# Patient Record
Sex: FEMALE | Race: WHITE | NOT HISPANIC OR LATINO
[De-identification: names, ages, dates, MRNs, and addresses within clinical notes are randomized per-mention and may not be internally consistent; named-entity substitution may affect disease eponyms.]

---

## 2018-10-23 ENCOUNTER — APPOINTMENT (OUTPATIENT)
Dept: GASTROENTEROLOGY | Facility: CLINIC | Age: 68
End: 2018-10-23
Payer: MEDICARE

## 2018-10-23 VITALS
TEMPERATURE: 99 F | HEIGHT: 67 IN | HEART RATE: 91 BPM | BODY MASS INDEX: 31.55 KG/M2 | DIASTOLIC BLOOD PRESSURE: 84 MMHG | SYSTOLIC BLOOD PRESSURE: 120 MMHG | WEIGHT: 201 LBS | OXYGEN SATURATION: 98 %

## 2018-10-23 DIAGNOSIS — Z83.3 FAMILY HISTORY OF DIABETES MELLITUS: ICD-10-CM

## 2018-10-23 DIAGNOSIS — Z82.5 FAMILY HISTORY OF ASTHMA AND OTHER CHRONIC LOWER RESPIRATORY DISEASES: ICD-10-CM

## 2018-10-23 DIAGNOSIS — Z82.49 FAMILY HISTORY OF ISCHEMIC HEART DISEASE AND OTHER DISEASES OF THE CIRCULATORY SYSTEM: ICD-10-CM

## 2018-10-23 DIAGNOSIS — G47.9 SLEEP DISORDER, UNSPECIFIED: ICD-10-CM

## 2018-10-23 DIAGNOSIS — K63.9 DISEASE OF INTESTINE, UNSPECIFIED: ICD-10-CM

## 2018-10-23 DIAGNOSIS — Z82.61 FAMILY HISTORY OF ARTHRITIS: ICD-10-CM

## 2018-10-23 DIAGNOSIS — Z87.39 PERSONAL HISTORY OF OTHER DISEASES OF THE MUSCULOSKELETAL SYSTEM AND CONNECTIVE TISSUE: ICD-10-CM

## 2018-10-23 DIAGNOSIS — Z81.1 FAMILY HISTORY OF ALCOHOL ABUSE AND DEPENDENCE: ICD-10-CM

## 2018-10-23 DIAGNOSIS — Z87.19 PERSONAL HISTORY OF OTHER DISEASES OF THE DIGESTIVE SYSTEM: ICD-10-CM

## 2018-10-23 DIAGNOSIS — Z86.39 PERSONAL HISTORY OF OTHER ENDOCRINE, NUTRITIONAL AND METABOLIC DISEASE: ICD-10-CM

## 2018-10-23 DIAGNOSIS — Z87.42 PERSONAL HISTORY OF OTHER DISEASES OF THE FEMALE GENITAL TRACT: ICD-10-CM

## 2018-10-23 DIAGNOSIS — Z87.898 PERSONAL HISTORY OF OTHER SPECIFIED CONDITIONS: ICD-10-CM

## 2018-10-23 DIAGNOSIS — Z12.11 ENCOUNTER FOR SCREENING FOR MALIGNANT NEOPLASM OF COLON: ICD-10-CM

## 2018-10-23 DIAGNOSIS — Z82.69 FAMILY HISTORY OF OTHER DISEASES OF THE MUSCULOSKELETAL SYSTEM AND CONNECTIVE TISSUE: ICD-10-CM

## 2018-10-23 DIAGNOSIS — Z83.49 FAMILY HISTORY OF OTHER ENDOCRINE, NUTRITIONAL AND METABOLIC DISEASES: ICD-10-CM

## 2018-10-23 PROCEDURE — 99204 OFFICE O/P NEW MOD 45 MIN: CPT

## 2018-10-23 RX ORDER — GRAPE SEED EXTRACT 50 MG
CAPSULE ORAL
Refills: 0 | Status: ACTIVE | COMMUNITY

## 2018-10-23 RX ORDER — PSYLLIUM HUSK 0.4 G
CAPSULE ORAL
Refills: 0 | Status: ACTIVE | COMMUNITY

## 2018-10-23 RX ORDER — TEMAZEPAM 15 MG/1
15 CAPSULE ORAL
Refills: 0 | Status: ACTIVE | COMMUNITY

## 2018-10-23 RX ORDER — DULOXETINE HYDROCHLORIDE 60 MG/1
60 CAPSULE, DELAYED RELEASE PELLETS ORAL
Refills: 0 | Status: ACTIVE | COMMUNITY

## 2018-10-23 RX ORDER — CHOLECALCIFEROL (VITAMIN D3) 50 MCG
CAPSULE ORAL
Refills: 0 | Status: ACTIVE | COMMUNITY

## 2018-10-23 RX ORDER — MULTIVIT-MIN/FA/LYCOPEN/LUTEIN .4-300-25
TABLET ORAL
Refills: 0 | Status: ACTIVE | COMMUNITY

## 2018-10-23 RX ORDER — DEXLANSOPRAZOLE 60 MG/1
60 CAPSULE, DELAYED RELEASE ORAL
Refills: 0 | Status: ACTIVE | COMMUNITY

## 2018-10-23 RX ORDER — ESOMEPRAZOLE MAGNESIUM 20 MG/1
20 CAPSULE, DELAYED RELEASE ORAL
Refills: 0 | Status: ACTIVE | COMMUNITY

## 2018-10-23 RX ORDER — LEVOTHYROXINE SODIUM 112 UG/1
112 TABLET ORAL
Refills: 0 | Status: ACTIVE | COMMUNITY

## 2018-10-23 RX ORDER — CHOLECALCIFEROL (VITAMIN D3) 25 MCG
TABLET ORAL
Refills: 0 | Status: ACTIVE | COMMUNITY

## 2018-10-23 RX ORDER — ACETAMINOPHEN 500 MG
TABLET ORAL
Refills: 0 | Status: ACTIVE | COMMUNITY

## 2018-10-23 RX ORDER — ASCORBIC ACID 500 MG
TABLET ORAL
Refills: 0 | Status: ACTIVE | COMMUNITY

## 2018-10-23 RX ORDER — SODIUM SULFATE, POTASSIUM SULFATE, MAGNESIUM SULFATE 17.5; 3.13; 1.6 G/ML; G/ML; G/ML
17.5-3.13-1.6 SOLUTION, CONCENTRATE ORAL
Qty: 1 | Refills: 0 | Status: ACTIVE | COMMUNITY
Start: 2018-10-23 | End: 1900-01-01

## 2018-11-21 ENCOUNTER — APPOINTMENT (OUTPATIENT)
Dept: GASTROENTEROLOGY | Facility: AMBULATORY MEDICAL SERVICES | Age: 68
End: 2018-11-21

## 2020-12-16 PROBLEM — Z12.11 ENCOUNTER FOR SCREENING COLONOSCOPY: Status: RESOLVED | Noted: 2018-10-23 | Resolved: 2020-12-16

## 2023-08-25 ENCOUNTER — TRANSCRIPTION ENCOUNTER (OUTPATIENT)
Age: 73
End: 2023-08-25

## 2023-08-25 ENCOUNTER — APPOINTMENT (OUTPATIENT)
Dept: ORTHOPEDIC SURGERY | Facility: CLINIC | Age: 73
End: 2023-08-25
Payer: MEDICARE

## 2023-08-25 DIAGNOSIS — Z00.00 ENCOUNTER FOR GENERAL ADULT MEDICAL EXAMINATION W/OUT ABNORMAL FINDINGS: ICD-10-CM

## 2023-08-25 PROCEDURE — 99203 OFFICE O/P NEW LOW 30 MIN: CPT

## 2023-08-25 PROCEDURE — 73502 X-RAY EXAM HIP UNI 2-3 VIEWS: CPT | Mod: FY

## 2023-08-25 RX ORDER — MELOXICAM 15 MG/1
15 TABLET ORAL
Qty: 30 | Refills: 2 | Status: ACTIVE | COMMUNITY
Start: 2023-08-25 | End: 1900-01-01

## 2023-08-25 NOTE — DISCUSSION/SUMMARY
[de-identified] : History, clinical examination and imaging were most consistent with: -right hip osteoarthritis  The diagnosis was explained in detail. The potential non-surgical and surgical treatments were reviewed. The relative risks and benefits of each option were considered relative to the patients age, activity level, medical history, symptom severity and previously attempted treatments.  The patient was advised to consult with their primary medical provider prior to initiation of any new medications to reduce the risk of adverse effects specific to their long-term home medications and medical history. The risk of gastrointestinal irritation and kidney injury specific to long-term NSAID use was discussed.  -Meloxicam as needed for pain. -Referral provided to arthroplasty specialist for consultation as per patient request.  -Follow up with me as needed.   (MDM)  Problem Complexity -Moderate: chronic illness with exacerbation  Risk -Moderate: prescription medication  -Patient has not been seen by another provider in my practice within the past 2 years who specializes in orthopedic sports medicine, shoulder or elbow surgery.

## 2023-08-25 NOTE — HISTORY OF PRESENT ILLNESS
[de-identified] : Date of initial evaluation is 8/25 Patient age is 72 Occupation is retired Body part causing symptoms is the right hip  Symptoms began years ago, worse recently Location of pain is groin Quality of pain is sharp Pain score at rest is 3 Pain score during activity is 8 Radicular symptoms are not present Prior treatments include stem cell injection, OTC medication, rest Patient's condition is not associated with workers compensation, no-fault or interscholastic athletics

## 2023-08-25 NOTE — IMAGING
[Right] : right hip [All Views] : anteroposterior, lateral [Severe arthritis (Tonnis Grade 3)] : Severe arthritis (Tonnis Grade 3) [de-identified] : (Exam: Hip)   Laterality is right    Patient is in no acute distress, alert and oriented Sensation is grossly intact to light touch in the foot Motor function is 5/5 in the foot Capillary refill is less than 2 seconds in the toes Lymphadenopathy is not present Peripheral edema is not present   Skin is intact Swelling is not present Atrophy is not present   Trochanteric tenderness is not present Groin tenderness is present Lumbar tenderness is not present   Flexion is 90 External rotation is 30 Internal rotation is 0 Abduction is 30   Flexion strength is 5/5 Extension strength is 5/5 Abduction strength is 5/5 Adduction strength is 5/5

## 2023-09-07 ENCOUNTER — APPOINTMENT (OUTPATIENT)
Dept: ORTHOPEDIC SURGERY | Facility: CLINIC | Age: 73
End: 2023-09-07
Payer: MEDICARE

## 2023-09-07 PROCEDURE — 99213 OFFICE O/P EST LOW 20 MIN: CPT

## 2023-09-07 NOTE — HISTORY OF PRESENT ILLNESS
[de-identified] : Patient is here today for her right hip. Patient was referred by Dr. Montero.  Patient has had RIght hip pain for yrs. SHe has tried NSAIDS and HEP. She has also tried stem cell injection which helped for approx 2 mos however since relief ceased she has noted increased pain.  She is limping daily and notes pain constantly. She has difficulty with stairs, sitting , driving.  She notes pain into the groin worse with any activity.

## 2023-09-07 NOTE — DISCUSSION/SUMMARY
[de-identified] : Options were discussed. Patient cant take the pain she wants to have JOHN ANTERIOR APPROACH in November The Risks, benefits, alternatives and expectations of the proposed procedure, as well as non-operative management, were discussed at length with the patient, as well as the procedure itself and the expected recovery period. The possible need for post operative Physical Therapy was also discussed. The patient was allowed as much tome as necessary to ask all appropriate questions and they were answered to the patient's satisfaction   Risks involving the JOHN were discussed and include infection, bleeding, anesthesia complications, NV injury, fracture, leg length inequality, dislocation, DVT/PE, or heterotopic ossification.  She understands there is risk of injury to the LCFN and the complications associated. SHe understands and agrees to proceed with anterior approach Patient D/W Dr Arellano  She has her PMD and Dentist in [place] which she travels back and forth. She understands she will need dental and med clearance.

## 2023-09-07 NOTE — PHYSICAL EXAM
[Right] : right hip with pelvis [de-identified] : Constitutional: The patient appears well developed, well nourished. Examination of patients ability to communicate functionally was normal.   Neurologic: Coordination is normal. Alert and oriented to time, place and person. No evidence of mood disorder, calm affect.       RIGHT  HIP/THIGH : Inspection of the hip/thigh is as follows: Inspection shows no swelling, no ecchymosis, no erythema, no rashes and no masses.   Palpation of the hip/thigh is as follows: groin tenderness. no palpable defects and no palpable masses.   Range of motion of the hip is as follows in degrees:  PATIENT GUARDING DUE TO PAIN Flexion: 90   Abduction:  10   External rotation:  10  Internal rotaion:  0  Stength testing of the hip/thigh is as follows: Hip flexion strength:   5/5  Hip extension strength:  5/5  Hip abductionstrength:  5/5 Hip adductionstrength:  5/5  Neurological testing of the hip/thigh is as follows: motor exam 5/5 throughout, light touch intact throughout and no focal motor defecits.   Gait and function is as follows: antalgic gait.    [FreeTextEntry9] : xrays taken in office several weeks ago show severe Right hip DJD no frx noted

## 2023-10-31 ENCOUNTER — APPOINTMENT (OUTPATIENT)
Dept: ORTHOPEDIC SURGERY | Facility: HOSPITAL | Age: 73
End: 2023-10-31
Payer: MEDICARE

## 2023-10-31 PROCEDURE — 27130 TOTAL HIP ARTHROPLASTY: CPT | Mod: RT

## 2023-10-31 PROCEDURE — 27130 TOTAL HIP ARTHROPLASTY: CPT | Mod: AS,RT

## 2023-11-16 ENCOUNTER — APPOINTMENT (OUTPATIENT)
Dept: ORTHOPEDIC SURGERY | Facility: CLINIC | Age: 73
End: 2023-11-16
Payer: MEDICARE

## 2023-11-16 PROCEDURE — 73502 X-RAY EXAM HIP UNI 2-3 VIEWS: CPT

## 2023-11-16 PROCEDURE — 99024 POSTOP FOLLOW-UP VISIT: CPT

## 2023-11-21 RX ORDER — OXYCODONE 5 MG/1
5 TABLET ORAL
Qty: 40 | Refills: 0 | Status: ACTIVE | COMMUNITY
Start: 2023-11-21 | End: 1900-01-01

## 2023-12-21 ENCOUNTER — APPOINTMENT (OUTPATIENT)
Dept: ORTHOPEDIC SURGERY | Facility: CLINIC | Age: 73
End: 2023-12-21
Payer: MEDICARE

## 2023-12-21 PROCEDURE — 99024 POSTOP FOLLOW-UP VISIT: CPT

## 2023-12-21 NOTE — HISTORY OF PRESENT ILLNESS
[de-identified] : following up for the right hip. Patient is s/p R JOHN ANterior approach  10/31/2023.  Patient states pain has improved. She has been working with PT and progressing well.  She wants to be able to extend the hip and play pickleball.  She is going to [place] until May.  She wants to continue down there.

## 2023-12-21 NOTE — PHYSICAL EXAM
[de-identified] : Constitutional: The patient appears well developed, well nourished.       Neurologic: Coordination is normal. Alert and oriented to time, place and person. No evidence of mood disorder, calm affect.        RIGHT     HIP/THIGH:  Inspection of the hip/thigh is as follows: Inspection shows mild   swelling, NO ecchymosis laterally and NO ecchymosis over buttock. Inspection shows no erythema and no rashes.    Inspection of the wound reveals clean and dry incision, no fluctuance, no sign of infection, no erythema and no drainage. Adhesive mesh removed. Wound WELL HEALED>      Palpation of the hip/thigh is as follows: Thigh/calf soft NT       Range of motion of the hip is as follows in degrees:    Flexion:  100    Abduction:  40   External rotation:  30    IR : 35  Strength testing of the hip/thigh is as follows:    Hip flexion strength:   5/5   Hip extension strength:  5/5    Hip abduction strength:  5/5     Hip adduction strength:  5/5      Neurological testing of the hip/thigh is as follows: motor exam 5/5 throughout, light touch intact throughout and no focal motor defecits.   THERE IS MINIMAL DIMINISHED SENSATION JUST LATERAL TO THE INCISION     Gait and function is as follows: uses walker.       Vascularity of the hip/thigh is as follows: Dorsalis pedis 2+ and Posterior tibial 2+     Patient able to SLR against gravity

## 2024-03-27 RX ORDER — AMOXICILLIN 500 MG/1
500 TABLET, FILM COATED ORAL
Qty: 16 | Refills: 3 | Status: ACTIVE | COMMUNITY
Start: 2024-03-27 | End: 1900-01-01

## 2024-06-06 ENCOUNTER — APPOINTMENT (OUTPATIENT)
Dept: ORTHOPEDIC SURGERY | Facility: CLINIC | Age: 74
End: 2024-06-06
Payer: MEDICARE

## 2024-06-06 DIAGNOSIS — M16.11 UNILATERAL PRIMARY OSTEOARTHRITIS, RIGHT HIP: ICD-10-CM

## 2024-06-06 PROCEDURE — 99214 OFFICE O/P EST MOD 30 MIN: CPT

## 2024-06-06 NOTE — HISTORY OF PRESENT ILLNESS
[de-identified] : following up for the right hip. Patient is s/p R JOHN ANterior approach 10/31/2023. Patient states they have been going to PT while they were in [place]. Patient reports they have been very active and hip is feeling great.

## 2024-06-06 NOTE — DISCUSSION/SUMMARY
97.8 [de-identified] : Extensive discussion of the options was had with the patient. This discussion included both surgical and nonsurgical options. Options including but not limited to cortisone injection physical therapy, oral anti-inflammatories were discussed with the patient. We also discussed the option of observation, allowing the patient to continue rest, ice, prescription drug NSAIDs and following up if the condition does not improve. Time was taken to go over any questions the patient had in regard to any of the treatment plans described.   At this time the plan is for the patient to observe and continue self care including but not limited to HEP, Ice, NSAIDs and rest as needed. Patient was instructed to f/u if their symptoms do not improve or if there are any further concerns.   Patient feeling great, incision is well healed at this time.   Entered by Timbo Ramirez acting as scribe. Dr. Arellano Attestation The documentation recorded by the scribe, in my presence, accurately reflects the service I, Dr. Arellano, personally performed, and the decisions made by me with my edits as appropriate.

## 2024-06-06 NOTE — PHYSICAL EXAM
[de-identified] : Constitutional: The patient appears well developed, well nourished.       Neurologic: Coordination is normal. Alert and oriented to time, place and person. No evidence of mood disorder, calm affect.        RIGHT     HIP/THIGH:  Inspection of the hip/thigh is as follows: Inspection shows mild   swelling, NO ecchymosis laterally and NO ecchymosis over buttock. Inspection shows no erythema and no rashes.    Inspection of the wound reveals clean and dry incision, no fluctuance, no sign of infection, no erythema and no drainage. Adhesive mesh removed. Wound WELL HEALED>      Palpation of the hip/thigh is as follows: Thigh/calf soft NT       Range of motion of the hip is as follows in degrees:    Flexion:  100    Abduction:  40   External rotation:  30    IR : 35  Strength testing of the hip/thigh is as follows:    Hip flexion strength:   5/5   Hip extension strength:  5/5    Hip abduction strength:  5/5     Hip adduction strength:  5/5      Neurological testing of the hip/thigh is as follows: motor exam 5/5 throughout, light touch intact throughout and no focal motor defecits.   THERE IS MINIMAL DIMINISHED SENSATION JUST LATERAL TO THE INCISION     Gait and function is as follows: uses walker.       Vascularity of the hip/thigh is as follows: Dorsalis pedis 2+ and Posterior tibial 2+     Patient able to SLR against gravity

## 2025-06-05 ENCOUNTER — APPOINTMENT (OUTPATIENT)
Dept: ORTHOPEDIC SURGERY | Facility: CLINIC | Age: 75
End: 2025-06-05
Payer: MEDICARE

## 2025-06-05 DIAGNOSIS — M70.61 TROCHANTERIC BURSITIS, RIGHT HIP: ICD-10-CM

## 2025-06-05 DIAGNOSIS — M16.11 UNILATERAL PRIMARY OSTEOARTHRITIS, RIGHT HIP: ICD-10-CM

## 2025-06-05 DIAGNOSIS — Z96.641 PRESENCE OF RIGHT ARTIFICIAL HIP JOINT: ICD-10-CM

## 2025-06-05 PROCEDURE — 99214 OFFICE O/P EST MOD 30 MIN: CPT

## 2025-06-05 PROCEDURE — 73502 X-RAY EXAM HIP UNI 2-3 VIEWS: CPT

## 2025-06-05 RX ORDER — MELOXICAM 15 MG/1
15 TABLET ORAL
Qty: 30 | Refills: 0 | Status: ACTIVE | COMMUNITY
Start: 2025-06-05 | End: 1900-01-01

## 2025-07-10 ENCOUNTER — RX RENEWAL (OUTPATIENT)
Age: 75
End: 2025-07-10

## 2025-07-17 ENCOUNTER — APPOINTMENT (OUTPATIENT)
Dept: ORTHOPEDIC SURGERY | Facility: CLINIC | Age: 75
End: 2025-07-17

## 2025-07-17 PROCEDURE — 99213 OFFICE O/P EST LOW 20 MIN: CPT

## 2025-09-08 ENCOUNTER — TRANSCRIPTION ENCOUNTER (OUTPATIENT)
Age: 75
End: 2025-09-08

## 2025-09-16 ENCOUNTER — APPOINTMENT (OUTPATIENT)
Dept: CARE COORDINATION | Facility: HOME HEALTH | Age: 75
End: 2025-09-16
Payer: MEDICARE

## 2025-09-16 VITALS
SYSTOLIC BLOOD PRESSURE: 130 MMHG | OXYGEN SATURATION: 99 % | HEART RATE: 81 BPM | RESPIRATION RATE: 16 BRPM | DIASTOLIC BLOOD PRESSURE: 82 MMHG

## 2025-09-16 DIAGNOSIS — R73.03 PREDIABETES.: ICD-10-CM

## 2025-09-16 DIAGNOSIS — J44.9 CHRONIC OBSTRUCTIVE PULMONARY DISEASE, UNSPECIFIED: ICD-10-CM

## 2025-09-16 DIAGNOSIS — J84.10 PULMONARY FIBROSIS, UNSPECIFIED: ICD-10-CM

## 2025-09-16 DIAGNOSIS — I10 ESSENTIAL (PRIMARY) HYPERTENSION: ICD-10-CM

## 2025-09-16 PROCEDURE — 99495 TRANSJ CARE MGMT MOD F2F 14D: CPT

## 2025-09-16 RX ORDER — ALBUTEROL SULFATE 90 UG/1
108 (90 BASE) AEROSOL, METERED RESPIRATORY (INHALATION)
Refills: 0 | Status: ACTIVE | COMMUNITY

## 2025-09-16 RX ORDER — LEVOTHYROXINE SODIUM 0.09 MG/1
88 TABLET ORAL
Refills: 0 | Status: ACTIVE | COMMUNITY

## 2025-09-17 ENCOUNTER — TRANSCRIPTION ENCOUNTER (OUTPATIENT)
Age: 75
End: 2025-09-17